# Patient Record
Sex: MALE | Race: WHITE | NOT HISPANIC OR LATINO
[De-identification: names, ages, dates, MRNs, and addresses within clinical notes are randomized per-mention and may not be internally consistent; named-entity substitution may affect disease eponyms.]

---

## 2021-10-26 PROBLEM — Z00.00 ENCOUNTER FOR PREVENTIVE HEALTH EXAMINATION: Status: ACTIVE | Noted: 2021-10-26

## 2021-11-01 ENCOUNTER — APPOINTMENT (OUTPATIENT)
Dept: HEART AND VASCULAR | Facility: CLINIC | Age: 81
End: 2021-11-01
Payer: MEDICARE

## 2021-11-01 VITALS
HEART RATE: 80 BPM | HEIGHT: 69 IN | OXYGEN SATURATION: 98 % | WEIGHT: 165 LBS | TEMPERATURE: 98.7 F | DIASTOLIC BLOOD PRESSURE: 80 MMHG | SYSTOLIC BLOOD PRESSURE: 140 MMHG | BODY MASS INDEX: 24.44 KG/M2

## 2021-11-01 PROCEDURE — 99204 OFFICE O/P NEW MOD 45 MIN: CPT

## 2021-11-01 RX ORDER — DORZOLAMIDE HYDROCHLORIDE TIMOLOL MALEATE 20; 5 MG/ML; MG/ML
22.3-6.8 SOLUTION/ DROPS OPHTHALMIC
Qty: 30 | Refills: 0 | Status: ACTIVE | COMMUNITY
Start: 2021-03-05

## 2021-11-01 RX ORDER — LATANOPROST/PF 0.005 %
0.01 DROPS OPHTHALMIC (EYE)
Qty: 7 | Refills: 0 | Status: ACTIVE | COMMUNITY
Start: 2021-09-29

## 2021-11-02 NOTE — ASSESSMENT
[FreeTextEntry1] : 82 y/o M with asthma, chronic diastolic heart failure, severe AS, moderate AI, HLD who presents with NYHA III symptoms. No wheezing on exam, suspect SOB AS related (and not asthma).\par -Pt will need CCTA (Structural) and Cardiac cath at [facility]\par -CT surgery consult with Dr. Garcia\par -continue current BP/lipid control with Dr. López\par -TAVR workup (including carotid ultrasound)\par

## 2021-11-02 NOTE — PHYSICAL EXAM
[Well Developed] : well developed [Well Nourished] : well nourished [No Acute Distress] : no acute distress [Normal Conjunctiva] : normal conjunctiva [Normal Venous Pressure] : normal venous pressure [No Carotid Bruit] : no carotid bruit [No Rub] : no rub [No Gallop] : no gallop [Clear Lung Fields] : clear lung fields [Good Air Entry] : good air entry [No Respiratory Distress] : no respiratory distress  [Soft] : abdomen soft [Non Tender] : non-tender [No Masses/organomegaly] : no masses/organomegaly [Normal Bowel Sounds] : normal bowel sounds [Normal Gait] : normal gait [No Edema] : no edema [No Cyanosis] : no cyanosis [No Clubbing] : no clubbing [No Varicosities] : no varicosities [No Rash] : no rash [No Skin Lesions] : no skin lesions [Moves all extremities] : moves all extremities [No Focal Deficits] : no focal deficits [Normal Speech] : normal speech [Alert and Oriented] : alert and oriented [Normal memory] : normal memory [de-identified] : +KELSEY III/VI with soft S2

## 2021-11-02 NOTE — REASON FOR VISIT
[Structural Heart and Valve Disease] : structural heart and valve disease [FreeTextEntry1] : 82 y/o F with Asthma, former smoker chronic diastolic heart failure and severe AS presenting with NYHA III symptoms.\par Pt reports occasional chest pain (unrelated to activity) under the L. breast bone for several weeks as well. He gets more SOB with exertion than before. \par \par EKG reviewed from 10/21/21- NSR, no ST changes\par Echo reviewed from 10/21/21- Normal LVEF, severe AS (MG 72mmHg, PG 130mmHg, GARRETT 0.7)\par Labs reviewed from 10/11/21- BMP WNL, Lipids- , CBC WNL

## 2021-11-02 NOTE — REVIEW OF SYSTEMS
[Dyspnea on exertion] : dyspnea during exertion [Chest Discomfort] : chest discomfort [Fever] : no fever [Chills] : no chills [Blurry Vision] : no blurred vision [Earache] : no earache [Sore Throat] : no sore throat [Lower Ext Edema] : no extremity edema [Syncope] : no syncope [Cough] : no cough [Abdominal Pain] : no abdominal pain [Change in Appetite] : no change in appetite [Urinary Frequency] : no change in urinary frequency [Joint Pain] : no joint pain [Rash] : no rash [Confusion] : no confusion was observed [Easy Bleeding] : no tendency for easy bleeding

## 2021-11-15 ENCOUNTER — APPOINTMENT (OUTPATIENT)
Dept: HEART AND VASCULAR | Facility: CLINIC | Age: 81
End: 2021-11-15
Payer: MEDICARE

## 2021-11-15 VITALS
SYSTOLIC BLOOD PRESSURE: 156 MMHG | BODY MASS INDEX: 24.29 KG/M2 | OXYGEN SATURATION: 96 % | HEART RATE: 79 BPM | WEIGHT: 164 LBS | DIASTOLIC BLOOD PRESSURE: 76 MMHG | HEIGHT: 69 IN

## 2021-11-15 PROCEDURE — 99214 OFFICE O/P EST MOD 30 MIN: CPT

## 2021-11-15 RX ORDER — ASPIRIN 81 MG
81 TABLET, DELAYED RELEASE (ENTERIC COATED) ORAL
Refills: 0 | Status: ACTIVE | COMMUNITY

## 2021-11-15 NOTE — PHYSICAL EXAM
[Well Developed] : well developed [Well Nourished] : well nourished [No Acute Distress] : no acute distress [Normal Conjunctiva] : normal conjunctiva [Normal Venous Pressure] : normal venous pressure [No Carotid Bruit] : no carotid bruit [No Rub] : no rub [No Gallop] : no gallop [Clear Lung Fields] : clear lung fields [Good Air Entry] : good air entry [No Respiratory Distress] : no respiratory distress  [Soft] : abdomen soft [Non Tender] : non-tender [No Masses/organomegaly] : no masses/organomegaly [Normal Bowel Sounds] : normal bowel sounds [Normal Gait] : normal gait [No Edema] : no edema [No Cyanosis] : no cyanosis [No Clubbing] : no clubbing [No Varicosities] : no varicosities [No Rash] : no rash [No Skin Lesions] : no skin lesions [Moves all extremities] : moves all extremities [No Focal Deficits] : no focal deficits [Normal Speech] : normal speech [Alert and Oriented] : alert and oriented [Normal memory] : normal memory [de-identified] : +KELSEY III/VI with soft S2

## 2021-11-15 NOTE — REASON FOR VISIT
[Cardiac Failure] : cardiac failure [Structural Heart and Valve Disease] : structural heart and valve disease [FreeTextEntry1] : 82 y/o F with Asthma, former smoker chronic diastolic heart failure and severe AS presenting with NYHA III symptoms/CCS III equivalent s/p PCI of the pLAD.\par Pt with improved symptoms since PCI and no CP since procedure. He still reports NYHA II symptoms though with exertion.\par No syncope/dizziness\par \par EKG reviewed from 10/21/21- NSR, no ST changes\par Echo reviewed from 10/21/21- Normal LVEF, severe AS (MG 72mmHg, PG 130mmHg, GARRETT 0.7)\par Labs reviewed from 10/11/21- BMP WNL, Lipids- , CBC WNL \par Cardiac cath 11/5/21- 1V CAD, pLAD 75% s/p PCI with excellent result

## 2021-12-06 ENCOUNTER — TRANSCRIPTION ENCOUNTER (OUTPATIENT)
Age: 81
End: 2021-12-06

## 2021-12-06 ENCOUNTER — NON-APPOINTMENT (OUTPATIENT)
Age: 81
End: 2021-12-06

## 2021-12-06 VITALS
RESPIRATION RATE: 17 BRPM | WEIGHT: 173.06 LBS | DIASTOLIC BLOOD PRESSURE: 62 MMHG | TEMPERATURE: 98 F | SYSTOLIC BLOOD PRESSURE: 132 MMHG | OXYGEN SATURATION: 97 % | HEIGHT: 68 IN | HEART RATE: 63 BPM

## 2021-12-06 NOTE — PATIENT PROFILE ADULT - FALL HARM RISK - UNIVERSAL INTERVENTIONS
Bed in lowest position, wheels locked, appropriate side rails in place/Call bell, personal items and telephone in reach/Instruct patient to call for assistance before getting out of bed or chair/Non-slip footwear when patient is out of bed/Indian to call system/Physically safe environment - no spills, clutter or unnecessary equipment/Purposeful Proactive Rounding/Room/bathroom lighting operational, light cord in reach

## 2021-12-07 ENCOUNTER — INPATIENT (INPATIENT)
Facility: HOSPITAL | Age: 81
LOS: 0 days | Discharge: HOME | DRG: 267 | End: 2021-12-08
Attending: INTERNAL MEDICINE | Admitting: INTERNAL MEDICINE
Payer: MEDICARE

## 2021-12-07 ENCOUNTER — APPOINTMENT (OUTPATIENT)
Dept: CARDIOTHORACIC SURGERY | Facility: HOSPITAL | Age: 81
End: 2021-12-07

## 2021-12-07 ENCOUNTER — APPOINTMENT (OUTPATIENT)
Dept: CARDIOTHORACIC SURGERY | Facility: CLINIC | Age: 81
End: 2021-12-07

## 2021-12-07 DIAGNOSIS — I25.10 ATHEROSCLEROTIC HEART DISEASE OF NATIVE CORONARY ARTERY WITHOUT ANGINA PECTORIS: Chronic | ICD-10-CM

## 2021-12-07 LAB
ALBUMIN SERPL ELPH-MCNC: 3.7 G/DL — SIGNIFICANT CHANGE UP (ref 3.3–5)
ALP SERPL-CCNC: 62 U/L — SIGNIFICANT CHANGE UP (ref 40–120)
ALT FLD-CCNC: 28 U/L — SIGNIFICANT CHANGE UP (ref 10–45)
ANION GAP SERPL CALC-SCNC: 11 MMOL/L — SIGNIFICANT CHANGE UP (ref 5–17)
ANION GAP SERPL CALC-SCNC: 9 MMOL/L — SIGNIFICANT CHANGE UP (ref 5–17)
APTT BLD: 28.8 SEC — SIGNIFICANT CHANGE UP (ref 27.5–35.5)
APTT BLD: 32.4 SEC — SIGNIFICANT CHANGE UP (ref 27.5–35.5)
AST SERPL-CCNC: 27 U/L — SIGNIFICANT CHANGE UP (ref 10–40)
BASOPHILS # BLD AUTO: 0.04 K/UL — SIGNIFICANT CHANGE UP (ref 0–0.2)
BASOPHILS NFR BLD AUTO: 0.6 % — SIGNIFICANT CHANGE UP (ref 0–2)
BILIRUB SERPL-MCNC: 0.8 MG/DL — SIGNIFICANT CHANGE UP (ref 0.2–1.2)
BLD GP AB SCN SERPL QL: NEGATIVE — SIGNIFICANT CHANGE UP
BLD GP AB SCN SERPL QL: NEGATIVE — SIGNIFICANT CHANGE UP
BUN SERPL-MCNC: 17 MG/DL — SIGNIFICANT CHANGE UP (ref 7–23)
BUN SERPL-MCNC: 18 MG/DL — SIGNIFICANT CHANGE UP (ref 7–23)
CALCIUM SERPL-MCNC: 8.7 MG/DL — SIGNIFICANT CHANGE UP (ref 8.4–10.5)
CALCIUM SERPL-MCNC: 9.7 MG/DL — SIGNIFICANT CHANGE UP (ref 8.4–10.5)
CHLORIDE SERPL-SCNC: 105 MMOL/L — SIGNIFICANT CHANGE UP (ref 96–108)
CHLORIDE SERPL-SCNC: 109 MMOL/L — HIGH (ref 96–108)
CO2 SERPL-SCNC: 22 MMOL/L — SIGNIFICANT CHANGE UP (ref 22–31)
CO2 SERPL-SCNC: 26 MMOL/L — SIGNIFICANT CHANGE UP (ref 22–31)
CREAT SERPL-MCNC: 0.89 MG/DL — SIGNIFICANT CHANGE UP (ref 0.5–1.3)
CREAT SERPL-MCNC: 0.99 MG/DL — SIGNIFICANT CHANGE UP (ref 0.5–1.3)
EOSINOPHIL # BLD AUTO: 0.08 K/UL — SIGNIFICANT CHANGE UP (ref 0–0.5)
EOSINOPHIL NFR BLD AUTO: 1.1 % — SIGNIFICANT CHANGE UP (ref 0–6)
GAS PNL BLDA: SIGNIFICANT CHANGE UP
GLUCOSE SERPL-MCNC: 100 MG/DL — HIGH (ref 70–99)
GLUCOSE SERPL-MCNC: 99 MG/DL — SIGNIFICANT CHANGE UP (ref 70–99)
HCT VFR BLD CALC: 39.7 % — SIGNIFICANT CHANGE UP (ref 39–50)
HCT VFR BLD CALC: 44.9 % — SIGNIFICANT CHANGE UP (ref 39–50)
HGB BLD-MCNC: 12.8 G/DL — LOW (ref 13–17)
HGB BLD-MCNC: 14.2 G/DL — SIGNIFICANT CHANGE UP (ref 13–17)
IMM GRANULOCYTES NFR BLD AUTO: 0.3 % — SIGNIFICANT CHANGE UP (ref 0–1.5)
INR BLD: 1.04 — SIGNIFICANT CHANGE UP (ref 0.88–1.16)
INR BLD: 1.13 — SIGNIFICANT CHANGE UP (ref 0.88–1.16)
LYMPHOCYTES # BLD AUTO: 0.87 K/UL — LOW (ref 1–3.3)
LYMPHOCYTES # BLD AUTO: 12.1 % — LOW (ref 13–44)
MAGNESIUM SERPL-MCNC: 1.9 MG/DL — SIGNIFICANT CHANGE UP (ref 1.6–2.6)
MAGNESIUM SERPL-MCNC: 2 MG/DL — SIGNIFICANT CHANGE UP (ref 1.6–2.6)
MCHC RBC-ENTMCNC: 28.9 PG — SIGNIFICANT CHANGE UP (ref 27–34)
MCHC RBC-ENTMCNC: 29.5 PG — SIGNIFICANT CHANGE UP (ref 27–34)
MCHC RBC-ENTMCNC: 31.6 GM/DL — LOW (ref 32–36)
MCHC RBC-ENTMCNC: 32.2 GM/DL — SIGNIFICANT CHANGE UP (ref 32–36)
MCV RBC AUTO: 91.3 FL — SIGNIFICANT CHANGE UP (ref 80–100)
MCV RBC AUTO: 91.5 FL — SIGNIFICANT CHANGE UP (ref 80–100)
MONOCYTES # BLD AUTO: 0.34 K/UL — SIGNIFICANT CHANGE UP (ref 0–0.9)
MONOCYTES NFR BLD AUTO: 4.7 % — SIGNIFICANT CHANGE UP (ref 2–14)
NEUTROPHILS # BLD AUTO: 5.85 K/UL — SIGNIFICANT CHANGE UP (ref 1.8–7.4)
NEUTROPHILS NFR BLD AUTO: 81.2 % — HIGH (ref 43–77)
NRBC # BLD: 0 /100 WBCS — SIGNIFICANT CHANGE UP (ref 0–0)
NRBC # BLD: 0 /100 WBCS — SIGNIFICANT CHANGE UP (ref 0–0)
NT-PROBNP SERPL-SCNC: 584 PG/ML — HIGH (ref 0–300)
NT-PROBNP SERPL-SCNC: 600 PG/ML — HIGH (ref 0–300)
PHOSPHATE SERPL-MCNC: 3.1 MG/DL — SIGNIFICANT CHANGE UP (ref 2.5–4.5)
PLATELET # BLD AUTO: 137 K/UL — LOW (ref 150–400)
PLATELET # BLD AUTO: 170 K/UL — SIGNIFICANT CHANGE UP (ref 150–400)
POTASSIUM SERPL-MCNC: 4 MMOL/L — SIGNIFICANT CHANGE UP (ref 3.5–5.3)
POTASSIUM SERPL-MCNC: 4.2 MMOL/L — SIGNIFICANT CHANGE UP (ref 3.5–5.3)
POTASSIUM SERPL-SCNC: 4 MMOL/L — SIGNIFICANT CHANGE UP (ref 3.5–5.3)
POTASSIUM SERPL-SCNC: 4.2 MMOL/L — SIGNIFICANT CHANGE UP (ref 3.5–5.3)
PROT SERPL-MCNC: 6 G/DL — SIGNIFICANT CHANGE UP (ref 6–8.3)
PROTHROM AB SERPL-ACNC: 12.4 SEC — SIGNIFICANT CHANGE UP (ref 10.6–13.6)
PROTHROM AB SERPL-ACNC: 13.5 SEC — SIGNIFICANT CHANGE UP (ref 10.6–13.6)
RBC # BLD: 4.34 M/UL — SIGNIFICANT CHANGE UP (ref 4.2–5.8)
RBC # BLD: 4.92 M/UL — SIGNIFICANT CHANGE UP (ref 4.2–5.8)
RBC # FLD: 13.2 % — SIGNIFICANT CHANGE UP (ref 10.3–14.5)
RBC # FLD: 13.2 % — SIGNIFICANT CHANGE UP (ref 10.3–14.5)
RH IG SCN BLD-IMP: POSITIVE — SIGNIFICANT CHANGE UP
RH IG SCN BLD-IMP: POSITIVE — SIGNIFICANT CHANGE UP
SODIUM SERPL-SCNC: 140 MMOL/L — SIGNIFICANT CHANGE UP (ref 135–145)
SODIUM SERPL-SCNC: 142 MMOL/L — SIGNIFICANT CHANGE UP (ref 135–145)
WBC # BLD: 7.2 K/UL — SIGNIFICANT CHANGE UP (ref 3.8–10.5)
WBC # BLD: 9.14 K/UL — SIGNIFICANT CHANGE UP (ref 3.8–10.5)
WBC # FLD AUTO: 7.2 K/UL — SIGNIFICANT CHANGE UP (ref 3.8–10.5)
WBC # FLD AUTO: 9.14 K/UL — SIGNIFICANT CHANGE UP (ref 3.8–10.5)

## 2021-12-07 PROCEDURE — 99222 1ST HOSP IP/OBS MODERATE 55: CPT | Mod: 25

## 2021-12-07 PROCEDURE — 99291 CRITICAL CARE FIRST HOUR: CPT

## 2021-12-07 PROCEDURE — 93308 TTE F-UP OR LMTD: CPT | Mod: 26

## 2021-12-07 PROCEDURE — 71045 X-RAY EXAM CHEST 1 VIEW: CPT | Mod: 26

## 2021-12-07 PROCEDURE — 33361 REPLACE AORTIC VALVE PERQ: CPT | Mod: 62,Q0

## 2021-12-07 RX ORDER — ATORVASTATIN CALCIUM 80 MG/1
40 TABLET, FILM COATED ORAL AT BEDTIME
Refills: 0 | Status: DISCONTINUED | OUTPATIENT
Start: 2021-12-07 | End: 2021-12-08

## 2021-12-07 RX ORDER — ASPIRIN/CALCIUM CARB/MAGNESIUM 324 MG
81 TABLET ORAL DAILY
Refills: 0 | Status: DISCONTINUED | OUTPATIENT
Start: 2021-12-08 | End: 2021-12-08

## 2021-12-07 RX ORDER — DORZOLAMIDE HYDROCHLORIDE TIMOLOL MALEATE 20; 5 MG/ML; MG/ML
1 SOLUTION/ DROPS OPHTHALMIC
Qty: 0 | Refills: 0 | DISCHARGE

## 2021-12-07 RX ORDER — POLYETHYLENE GLYCOL 3350 17 G/17G
17 POWDER, FOR SOLUTION ORAL DAILY
Refills: 0 | Status: DISCONTINUED | OUTPATIENT
Start: 2021-12-07 | End: 2021-12-08

## 2021-12-07 RX ORDER — LATANOPROST 0.05 MG/ML
1 SOLUTION/ DROPS OPHTHALMIC; TOPICAL AT BEDTIME
Refills: 0 | Status: DISCONTINUED | OUTPATIENT
Start: 2021-12-07 | End: 2021-12-08

## 2021-12-07 RX ORDER — CLOPIDOGREL BISULFATE 75 MG/1
75 TABLET, FILM COATED ORAL DAILY
Refills: 0 | Status: DISCONTINUED | OUTPATIENT
Start: 2021-12-08 | End: 2021-12-08

## 2021-12-07 RX ORDER — PANTOPRAZOLE SODIUM 20 MG/1
40 TABLET, DELAYED RELEASE ORAL ONCE
Refills: 0 | Status: COMPLETED | OUTPATIENT
Start: 2021-12-07 | End: 2021-12-08

## 2021-12-07 RX ORDER — LATANOPROST 0.05 MG/ML
1 SOLUTION/ DROPS OPHTHALMIC; TOPICAL
Qty: 0 | Refills: 0 | DISCHARGE

## 2021-12-07 RX ORDER — CEFAZOLIN SODIUM 1 G
2000 VIAL (EA) INJECTION EVERY 8 HOURS
Refills: 0 | Status: COMPLETED | OUTPATIENT
Start: 2021-12-07 | End: 2021-12-08

## 2021-12-07 RX ORDER — SODIUM CHLORIDE 9 MG/ML
1000 INJECTION INTRAMUSCULAR; INTRAVENOUS; SUBCUTANEOUS
Refills: 0 | Status: DISCONTINUED | OUTPATIENT
Start: 2021-12-07 | End: 2021-12-08

## 2021-12-07 RX ORDER — DORZOLAMIDE HYDROCHLORIDE TIMOLOL MALEATE 20; 5 MG/ML; MG/ML
1 SOLUTION/ DROPS OPHTHALMIC
Refills: 0 | Status: DISCONTINUED | OUTPATIENT
Start: 2021-12-07 | End: 2021-12-08

## 2021-12-07 RX ORDER — HEPARIN SODIUM 5000 [USP'U]/ML
5000 INJECTION INTRAVENOUS; SUBCUTANEOUS EVERY 8 HOURS
Refills: 0 | Status: DISCONTINUED | OUTPATIENT
Start: 2021-12-07 | End: 2021-12-08

## 2021-12-07 RX ADMIN — Medication 100 MILLIGRAM(S): at 21:41

## 2021-12-07 RX ADMIN — LATANOPROST 1 DROP(S): 0.05 SOLUTION/ DROPS OPHTHALMIC; TOPICAL at 21:41

## 2021-12-07 RX ADMIN — ATORVASTATIN CALCIUM 40 MILLIGRAM(S): 80 TABLET, FILM COATED ORAL at 21:41

## 2021-12-07 RX ADMIN — DORZOLAMIDE HYDROCHLORIDE TIMOLOL MALEATE 1 DROP(S): 20; 5 SOLUTION/ DROPS OPHTHALMIC at 18:17

## 2021-12-07 RX ADMIN — HEPARIN SODIUM 5000 UNIT(S): 5000 INJECTION INTRAVENOUS; SUBCUTANEOUS at 21:41

## 2021-12-07 NOTE — H&P ADULT - NSICDXPASTSURGICALHX_GEN_ALL_CORE_FT
PAST SURGICAL HISTORY:  CAD S/P percutaneous coronary angioplasty PCI 11/2021     PAST SURGICAL HISTORY:  CAD S/P percutaneous coronary angioplasty PCI 11/5/2021

## 2021-12-07 NOTE — PROGRESS NOTE ADULT - SUBJECTIVE AND OBJECTIVE BOX
Patient discussed on morning rounds with Dr. Nuno    Operation / Date: 12/7/21 26mm Mckeon TAVR    SUBJECTIVE ASSESSMENT:  81y Male seen and examined. Feeling well. No complaints.        Vital Signs Last 24 Hrs  T(C): 36.5 (07 Dec 2021 17:27), Max: 36.7 (07 Dec 2021 17:06)  T(F): 97.7 (07 Dec 2021 17:27), Max: 98 (07 Dec 2021 17:06)  HR: 71 (07 Dec 2021 16:48) (71 - 71)  BP: --  BP(mean): --  RR: 15 (07 Dec 2021 16:48) (15 - 15)  SpO2: 100% (07 Dec 2021 16:48) (100% - 100%)  I&O's Detail    07 Dec 2021 07:01  -  07 Dec 2021 17:47  --------------------------------------------------------  IN:  Total IN: 0 mL    OUT:    Voided (mL): 200 mL  Total OUT: 200 mL    Total NET: -200 mL      PHYSICAL EXAM:  Appearance: No acute distress.  Neurologic: AAOx3, no AMS or focal deficits.  Responds appropriately to verbal and physical stimuli; exhibits purposeful movement in all extremities.  HEENT: dry mucus membranes  Neck: Supple, no JVD  Cardiovascular: RRR, S1 S2. No m/r/g.  Respiratory: No acute respiratory distress. CTAB   Gastrointestinal:  Soft, non-tender, non-distended.	  Extremities: warm, well perfused. No edema.  Vascular: Peripheral pulses palpable 2+ bilaterally.  Groins: soft, no hematoma bilaterally      LABS:                        12.8   7.20  )-----------( 137      ( 07 Dec 2021 17:05 )             39.7       COUMADIN:  No    PT/INR - ( 07 Dec 2021 17:05 )   PT: 13.5 sec;   INR: 1.13          PTT - ( 07 Dec 2021 17:05 )  PTT:32.4 sec    12-07    x   |  x   |  17  ----------------------------<  99  x    |  22  |  0.89    Ca    8.7      07 Dec 2021 17:05  Phos  3.1     12-07  Mg     1.9     12-07            MEDICATIONS  (STANDING):  atorvastatin 40 milliGRAM(s) Oral at bedtime  ceFAZolin   IVPB 2000 milliGRAM(s) IV Intermittent every 8 hours  dorzolamide 2%/timolol 0.5% Ophthalmic Solution 1 Drop(s) Both EYES two times a day  heparin   Injectable 5000 Unit(s) SubCutaneous every 8 hours  latanoprost 0.005% Ophthalmic Solution 1 Drop(s) Both EYES at bedtime  pantoprazole    Tablet 40 milliGRAM(s) Oral once  polyethylene glycol 3350 17 Gram(s) Oral daily  sodium chloride 0.9%. 1000 milliLiter(s) (10 mL/Hr) IV Continuous <Continuous>    MEDICATIONS  (PRN):        RADIOLOGY & ADDITIONAL TESTS:    
CTICU  CRITICAL  CARE  attending     Hand off received 					   Pertinent clinical, laboratory, radiographic, hemodynamic, echocardiographic, respiratory data, microbiologic data and chart were reviewed and analyzed frequently throughout the course of the day and night      81 years old male with Asthma, former smoker chronic diastolic heart failure and severe AS presenting with NYHA III symptoms/CCS III equivalent s/p PCI of the pLAD.  Pt with improved symptoms since PCI and no CP since procedure. He still reports NYHA II symptoms though with exertion.  No syncope/dizziness.    S/P TAVR      FAMILY HISTORY:  PAST MEDICAL & SURGICAL HISTORY:  CAD (coronary artery disease)    HLD (hyperlipidemia)  Aortic stenosis  Chronic diastolic heart failure  Glaucoma  CAD S/P percutaneous coronary angioplasty  PCI 11/5/2021        14 system review was unremarkable    Vital signs, hemodynamic and respiratory parameters were reviewed from the bedside nursing flow sheet.  ICU Vital Signs Last 24 Hrs  T(C): 36.5 (07 Dec 2021 17:27), Max: 36.7 (07 Dec 2021 17:06)  T(F): 97.7 (07 Dec 2021 17:27), Max: 98 (07 Dec 2021 17:06)  HR: 72 (07 Dec 2021 21:00) (66 - 78)  BP: 102/55 (07 Dec 2021 21:00) (100/58 - 102/55)  BP(mean): 75 (07 Dec 2021 21:00) (75 - 75)  ABP: 100/46 (07 Dec 2021 21:00) (91/51 - 124/53)  ABP(mean): 63 (07 Dec 2021 21:00) (63 - 81)  RR: 15 (07 Dec 2021 16:48) (15 - 15)  SpO2: 95% (07 Dec 2021 21:00) (94% - 100%)    Adult Advanced Hemodynamics Last 24 Hrs  CVP(mm Hg): --  CVP(cm H2O): --  CO: --  CI: --  PA: --  PA(mean): --  PCWP: --  SVR: --  SVRI: --  PVR: --  PVRI: --, ABG - ( 07 Dec 2021 17:05 )  pH, Arterial: 7.39  pH, Blood: x     /  pCO2: 37    /  pO2: 100   / HCO3: 22    / Base Excess: -2.2  /  SaO2: 98.9                Intake and output was reviewed and the fluid balance was calculated  Daily     Daily   I&O's Summary    07 Dec 2021 07:01  -  07 Dec 2021 21:29  --------------------------------------------------------  IN: 0 mL / OUT: 400 mL / NET: -400 mL        All lines and drain sites were assessed    Neuro: No change in the mental status from the baseline. Follows commands. Moves all 4 extremities.  Neck: No JVD.  CVS: S1, S2, No S3.  Lungs: Good air entry bilaterally.  Abd: Soft. No tenderness. + Bowel sounds.  Vascular: + DP/PT.  Extremities: No edema.  Lymphatic: Normal.  Skin: No abnormalities.      labs  CBC Full  -  ( 07 Dec 2021 17:05 )  WBC Count : 7.20 K/uL  RBC Count : 4.34 M/uL  Hemoglobin : 12.8 g/dL  Hematocrit : 39.7 %  Platelet Count - Automated : 137 K/uL  Mean Cell Volume : 91.5 fl  Mean Cell Hemoglobin : 29.5 pg  Mean Cell Hemoglobin Concentration : 32.2 gm/dL  Auto Neutrophil # : 5.85 K/uL  Auto Lymphocyte # : 0.87 K/uL  Auto Monocyte # : 0.34 K/uL  Auto Eosinophil # : 0.08 K/uL  Auto Basophil # : 0.04 K/uL  Auto Neutrophil % : 81.2 %  Auto Lymphocyte % : 12.1 %  Auto Monocyte % : 4.7 %  Auto Eosinophil % : 1.1 %  Auto Basophil % : 0.6 %    12-07    142  |  109<H>  |  17  ----------------------------<  99  4.0   |  22  |  0.89    Ca    8.7      07 Dec 2021 17:05  Phos  3.1     12-07  Mg     1.9     12-07    TPro  6.0  /  Alb  3.7  /  TBili  0.8  /  DBili  x   /  AST  27  /  ALT  28  /  AlkPhos  62  12-07    PT/INR - ( 07 Dec 2021 17:05 )   PT: 13.5 sec;   INR: 1.13          PTT - ( 07 Dec 2021 17:05 )  PTT:32.4 sec  The current medications were reviewed   MEDICATIONS  (STANDING):  atorvastatin 40 milliGRAM(s) Oral at bedtime  ceFAZolin   IVPB 2000 milliGRAM(s) IV Intermittent every 8 hours  dorzolamide 2%/timolol 0.5% Ophthalmic Solution 1 Drop(s) Both EYES two times a day  heparin   Injectable 5000 Unit(s) SubCutaneous every 8 hours  latanoprost 0.005% Ophthalmic Solution 1 Drop(s) Both EYES at bedtime  pantoprazole    Tablet 40 milliGRAM(s) Oral once  polyethylene glycol 3350 17 Gram(s) Oral daily  sodium chloride 0.9%. 1000 milliLiter(s) (10 mL/Hr) IV Continuous <Continuous>          81y old  Male with severe aortic stenosis.  S/P TAVR  Hemodynamically stable.  Good oxygenation.  Fair urine out put.        My plan includes :  Resume antiglaucoma meds.  Statin and Betablocker.  Close hemodynamic, ventilatory and drain monitoring and management  Monitor for arrhythmias and monitor parameters for organ perfusion  Monitor neurologic status  Monitor renal function.  Head of the bed should remain elevated to 45 deg .   Chest PT and IS will be encouraged  Monitor adequacy of oxygenation and ventilation and attempt to wean oxygen  Nutritional goals will be met using po eventually , ensure adequate caloric intake and monitor the same  Stress ulcer and VTE prophylaxis will be achieved    Glycemic control is satisfactory  Electrolytes have been repleted as necessary and wound care has been carried out. Pain control has been achieved.   Aggressive physical therapy and early mobility and ambulation goals will be met   The family was updated about the course and plan  CRITICAL CARE TIME SPENT in evaluation and management, reassessments, review and interpretation of labs and x-rays, ventilator and hemodynamic management, formulating a plan and coordinating care: ___90____ MIN.  Time does not include procedural time.  CTICU ATTENDING     					    Robert Saucedo MD

## 2021-12-07 NOTE — H&P ADULT - ATTENDING COMMENTS
80 y/o M with pmhx above s/p TAVR with a 26mm Bebe Ultra  -ASA/plaxix (CAD)  -euvolemic (chronic diastolic heart failure)  -continue statin therapy

## 2021-12-07 NOTE — H&P ADULT - NSHPPHYSICALEXAM_GEN_ALL_CORE
Physical Exam  CONSTITUTIONAL: Pleasant appearing in NAD.  NEURO: AAO x4. No neuro deficits appreciated.                      EYES: Pupils equal and reactive. No jaundice.  ENMT: NCAT. Neck supple and nontender.  CV: RRR. S1S2.  RESPIRATORY: CTABL. No wheezing, rhonchi.  GI: Soft, NT, ND. Positive BS.  : Atraumatic without bleeding. No esparza present.  MUSKULOSKELETAL: Equal ROM throughout.  SKIN: No erythema, cyanosis. Physical Exam  CONSTITUTIONAL: Pleasant appearing in NAD.  NEURO: AAO x4. No neuro deficits appreciated.                      EYES: Pupils equal and reactive. No jaundice.  ENMT: NCAT. Neck supple and nontender.  CV: +KELSEY with soft S2  RESPIRATORY: CTABL. No wheezing, rhonchi.  GI: Soft, NT, ND. Positive BS.  : Atraumatic without bleeding. No esparza present.  MUSKULOSKELETAL: Equal ROM throughout.  SKIN: No erythema, cyanosis.

## 2021-12-07 NOTE — BRIEF OPERATIVE NOTE - OPERATION/FINDINGS
Transfemoral TAVR utilizing b/l groins with percutaneous closure  TVP via groin, removed in OR w/manual pressure, no rhythm issues  EF nl

## 2021-12-07 NOTE — PROGRESS NOTE ADULT - ASSESSMENT
81 year old male with history of asthma, former smoker, chronic diastolic heart failure and severe AS with NYHA III symptoms and recent PCI to pLAD who underwent tramsfemoral TAVR 26mm Mckeon valve with Dr. Nuno. Procedure was uncomplicated and he was transferred to [address] [place] [address] ICU.        Neurovascular:  - No issures  - pain control with Tylenol PRN      Cardiovascular:   - POD0 s/p TAVR, 26mm Mckeon, no intraop EKG changes  - ASA 81, Plavix 75 daily  - Recent PCI to LAD - continue ASA, Plavix, statin  - HR and BP well controlld.    Respiratory:   02 Sat = 98% on 2L.  -If on oxygen wean to RA from for O2 Sat > 93%.  -Encourage C+DB and Use of IS 10x / hr while awake.  -CXR clear postop    GI:   - ADAT  - Protonix for GI PPX    Renal / :  -Monitor renal function.  -Monitor I/O's.    Endocrine:    - no issues    Hematologic:  - H/H stable postop  - Coags back to baseline after TAVR    ID:  - No issues  -Observe for SIRS/Sepsis Syndrome.    Prophylaxis:  -DVT prophylaxis with 5000 SubQ Heparin q8h.  -SCD's    Disposition:  -ICU for frequent monitoring.

## 2021-12-07 NOTE — H&P ADULT - HISTORY OF PRESENT ILLNESS
80 y/o F with Asthma, former smoker chronic diastolic heart failure and severe AS presenting with NYHA III symptoms/CCS III equivalent s/p PCI of the pLAD.  Pt with improved symptoms since PCI and no CP since procedure. He still reports NYHA II symptoms though with exertion.  No syncope/dizziness    Patient completed Pre-surgical testing and is scheduled for TAVR with Dr. Nuno.    Patient seen and examined prior to OR. Denies fever, chills, N/V, chest pain, SOB, weakness, dizziness, syncope. Medications reviewed with patient. Patient took his ASA 81 and Plavix this morning. He has been NPO since midnight. Patient is to undergo TAVR 12/7 with Dr. Nuno and Dr. Garcia. 80 y/o F with Asthma, former smoker chronic diastolic heart failure and severe AS presenting with NYHA III symptoms/CCS III equivalent s/p PCI of the pLAD.  Pt with improved symptoms since PCI and no CP since procedure. He still reports NYHA II symptoms though with exertion.  No syncope/dizziness    Patient completed Pre-surgical testing and is scheduled for TAVR with Dr. Nuno.    Patient seen and examined prior to OR. Admits to mild chest pain without radiation or other associated symptoms but is anxious. Denies fever, chills, N/V, SOB, weakness, dizziness, syncope. Medications reviewed with patient. Patient took his ASA 81 and Plavix this morning. He has been NPO since midnight. Patient is to undergo TAVR 12/7 with Dr. Nuno and Dr. Garcia.

## 2021-12-07 NOTE — H&P ADULT - NSHPREVIEWOFSYSTEMS_GEN_ALL_CORE
Review of Systems  CONSTITUTIONAL:  Denies Fevers / chills, sweats, fatigue, weight loss, weight gain                                      NEURO:  Denies parathesias, seizures, syncope, confusion                                                                                EYES:  Denies Blurry vision, discharge, pain, loss of vision                                                                                    ENMT:  Denies Difficulty hearing, vertigo, dysphagia, epistaxis, recent dental work                                       CV:  Denies Chest pain, palpitations, PUCKTET, orthopnea                                                                                          RESPIRATORY:  Denies Wheezing, SOB, cough / sputum, hemoptysis                                                                GI:  Denies Nausea, vommiting, diarrhea, constipation, melena, difficulty swallowing                                               : Denies Hematuria, dysuria, urgency, incontinence                                                                                         MUSKULOSKELETAL:  Denies arthritis, joint swelling, muscle weakness                                                             SKIN/BREAST:  Denies rash, itching, aicha loss, masses                                                                                            PSYCH:  Denies depresion, anxiety, suicidal ideation                                                                                               HEME/LYMPH:  Denies bruises easily, enlarged lymph nodes, tender lymph nodes                                        ENDOCRINE:  Denies cold intolerance, heat intolerance, polydipsia Review of Systems  CONSTITUTIONAL:  Denies Fevers / chills, sweats, fatigue, weight loss, weight gain                                      NEURO:  Denies parathesias, seizures, syncope, confusion                                                                                EYES:  Denies Blurry vision, discharge, pain, loss of vision                                                                                    ENMT:  Denies Difficulty hearing, vertigo, dysphagia, epistaxis, recent dental work                                       CV:  Admits to mild chest pain. Denies palpitations, PUCKETT, orthopnea                                                                                          RESPIRATORY:  Denies Wheezing, SOB, cough / sputum, hemoptysis                                                                GI:  Denies Nausea, vommiting, diarrhea, constipation, melena, difficulty swallowing                                               : Denies Hematuria, dysuria, urgency, incontinence                                                                                         MUSKULOSKELETAL:  Denies arthritis, joint swelling, muscle weakness                                                             SKIN/BREAST:  Denies rash, itching, aicha loss, masses                                                                                            PSYCH:  Admits to some anxiety. Denies depression, suicidal ideation                                                                                               HEME/LYMPH:  Denies bruises easily, enlarged lymph nodes, tender lymph nodes                                        ENDOCRINE:  Denies cold intolerance, heat intolerance, polydipsia

## 2021-12-07 NOTE — H&P ADULT - NSHPLABSRESULTS_GEN_ALL_CORE
14.2   9.14  )-----------( 170      ( 07 Dec 2021 12:50 )             44.9       12-07    140  |  105  |  18  ----------------------------<  100<H>  4.2   |  26  |  0.99    Ca    9.7      07 Dec 2021 12:50  Mg     2.0     12-07      PT/INR - ( 07 Dec 2021 12:50 )   PT: 12.4 sec;   INR: 1.04          PTT - ( 07 Dec 2021 12:50 )  PTT:28.8 sec

## 2021-12-07 NOTE — H&P ADULT - ASSESSMENT
80 y/o F with Asthma, former smoker chronic diastolic heart failure and severe AS presenting with NYHA III symptoms/CCS III equivalent s/p PCI of the pLAD.  Pt with improved symptoms since PCI and no CP since procedure. He still reports NYHA II symptoms though with exertion.  No syncope/dizziness    Patient presents on 12/7/21 for TAVR.    Assesment:  81y Male PMH of CAD s/p PCI 11/5/21, HLD, chronic diastolic HF, AS, Glaucoma who presents for TAVR        Plan:  Problem 1: Aortic stenosis  - Preop workup completed  - PAtient to undergo TAVR  - Patient took ASA and Plavix this morning    Problem 2: Coronary Artery Disease s/p PCI  - Continue DAPT after TAVR      Problem 3: HLD  - Continue Lipitor after TAVR      Problem 4: Glaucoma  - Continue eye gtts after TAVR    I have reviewed clinical labs tests and reports, radiology tests and reports, as well as old patient medical records, and discussed with the refering physician.

## 2021-12-08 ENCOUNTER — TRANSCRIPTION ENCOUNTER (OUTPATIENT)
Age: 81
End: 2021-12-08

## 2021-12-08 ENCOUNTER — NON-APPOINTMENT (OUTPATIENT)
Age: 81
End: 2021-12-08

## 2021-12-08 VITALS — TEMPERATURE: 99 F

## 2021-12-08 PROBLEM — I35.0 NONRHEUMATIC AORTIC (VALVE) STENOSIS: Chronic | Status: ACTIVE | Noted: 2021-12-07

## 2021-12-08 PROBLEM — E78.5 HYPERLIPIDEMIA, UNSPECIFIED: Chronic | Status: ACTIVE | Noted: 2021-12-07

## 2021-12-08 PROBLEM — H40.9 UNSPECIFIED GLAUCOMA: Chronic | Status: ACTIVE | Noted: 2021-12-07

## 2021-12-08 PROBLEM — I25.10 ATHEROSCLEROTIC HEART DISEASE OF NATIVE CORONARY ARTERY WITHOUT ANGINA PECTORIS: Chronic | Status: ACTIVE | Noted: 2021-12-07

## 2021-12-08 PROBLEM — I50.32 CHRONIC DIASTOLIC (CONGESTIVE) HEART FAILURE: Chronic | Status: ACTIVE | Noted: 2021-12-07

## 2021-12-08 LAB
ALBUMIN SERPL ELPH-MCNC: 3.4 G/DL — SIGNIFICANT CHANGE UP (ref 3.3–5)
ALP SERPL-CCNC: 58 U/L — SIGNIFICANT CHANGE UP (ref 40–120)
ALT FLD-CCNC: 26 U/L — SIGNIFICANT CHANGE UP (ref 10–45)
ANION GAP SERPL CALC-SCNC: 10 MMOL/L — SIGNIFICANT CHANGE UP (ref 5–17)
APTT BLD: 28.7 SEC — SIGNIFICANT CHANGE UP (ref 27.5–35.5)
AST SERPL-CCNC: 31 U/L — SIGNIFICANT CHANGE UP (ref 10–40)
BILIRUB SERPL-MCNC: 0.4 MG/DL — SIGNIFICANT CHANGE UP (ref 0.2–1.2)
BUN SERPL-MCNC: 21 MG/DL — SIGNIFICANT CHANGE UP (ref 7–23)
CALCIUM SERPL-MCNC: 9 MG/DL — SIGNIFICANT CHANGE UP (ref 8.4–10.5)
CHLORIDE SERPL-SCNC: 108 MMOL/L — SIGNIFICANT CHANGE UP (ref 96–108)
CO2 SERPL-SCNC: 21 MMOL/L — LOW (ref 22–31)
CREAT SERPL-MCNC: 0.89 MG/DL — SIGNIFICANT CHANGE UP (ref 0.5–1.3)
GAS PNL BLDA: SIGNIFICANT CHANGE UP
GLUCOSE SERPL-MCNC: 182 MG/DL — HIGH (ref 70–99)
HCT VFR BLD CALC: 38.1 % — LOW (ref 39–50)
HGB BLD-MCNC: 12.7 G/DL — LOW (ref 13–17)
INR BLD: 1.06 — SIGNIFICANT CHANGE UP (ref 0.88–1.16)
MAGNESIUM SERPL-MCNC: 2 MG/DL — SIGNIFICANT CHANGE UP (ref 1.6–2.6)
MCHC RBC-ENTMCNC: 30.2 PG — SIGNIFICANT CHANGE UP (ref 27–34)
MCHC RBC-ENTMCNC: 33.3 GM/DL — SIGNIFICANT CHANGE UP (ref 32–36)
MCV RBC AUTO: 90.7 FL — SIGNIFICANT CHANGE UP (ref 80–100)
NRBC # BLD: 0 /100 WBCS — SIGNIFICANT CHANGE UP (ref 0–0)
PHOSPHATE SERPL-MCNC: 2.6 MG/DL — SIGNIFICANT CHANGE UP (ref 2.5–4.5)
PLATELET # BLD AUTO: 166 K/UL — SIGNIFICANT CHANGE UP (ref 150–400)
POTASSIUM SERPL-MCNC: 4.5 MMOL/L — SIGNIFICANT CHANGE UP (ref 3.5–5.3)
POTASSIUM SERPL-SCNC: 4.5 MMOL/L — SIGNIFICANT CHANGE UP (ref 3.5–5.3)
PROT SERPL-MCNC: 5.7 G/DL — LOW (ref 6–8.3)
PROTHROM AB SERPL-ACNC: 12.7 SEC — SIGNIFICANT CHANGE UP (ref 10.6–13.6)
RBC # BLD: 4.2 M/UL — SIGNIFICANT CHANGE UP (ref 4.2–5.8)
RBC # FLD: 13.1 % — SIGNIFICANT CHANGE UP (ref 10.3–14.5)
SODIUM SERPL-SCNC: 139 MMOL/L — SIGNIFICANT CHANGE UP (ref 135–145)
WBC # BLD: 11.36 K/UL — HIGH (ref 3.8–10.5)
WBC # FLD AUTO: 11.36 K/UL — HIGH (ref 3.8–10.5)

## 2021-12-08 PROCEDURE — 71045 X-RAY EXAM CHEST 1 VIEW: CPT | Mod: 26

## 2021-12-08 PROCEDURE — 93306 TTE W/DOPPLER COMPLETE: CPT | Mod: 26

## 2021-12-08 PROCEDURE — 99238 HOSP IP/OBS DSCHRG MGMT 30/<: CPT

## 2021-12-08 RX ORDER — MAGNESIUM OXIDE 400 MG ORAL TABLET 241.3 MG
800 TABLET ORAL ONCE
Refills: 0 | Status: COMPLETED | OUTPATIENT
Start: 2021-12-08 | End: 2021-12-08

## 2021-12-08 RX ORDER — CLOPIDOGREL BISULFATE 75 MG/1
1 TABLET, FILM COATED ORAL
Qty: 0 | Refills: 0 | DISCHARGE

## 2021-12-08 RX ORDER — METOPROLOL TARTRATE 50 MG
12.5 TABLET ORAL EVERY 12 HOURS
Refills: 0 | Status: DISCONTINUED | OUTPATIENT
Start: 2021-12-08 | End: 2021-12-08

## 2021-12-08 RX ORDER — POTASSIUM CHLORIDE 20 MEQ
1 PACKET (EA) ORAL
Qty: 14 | Refills: 0
Start: 2021-12-08 | End: 2021-12-21

## 2021-12-08 RX ORDER — ATORVASTATIN CALCIUM 80 MG/1
1 TABLET, FILM COATED ORAL
Qty: 30 | Refills: 0
Start: 2021-12-08 | End: 2022-01-06

## 2021-12-08 RX ORDER — FUROSEMIDE 40 MG
1 TABLET ORAL
Qty: 14 | Refills: 0
Start: 2021-12-08 | End: 2021-12-21

## 2021-12-08 RX ORDER — SENNA PLUS 8.6 MG/1
2 TABLET ORAL
Qty: 28 | Refills: 0
Start: 2021-12-08 | End: 2021-12-21

## 2021-12-08 RX ORDER — CLOPIDOGREL BISULFATE 75 MG/1
1 TABLET, FILM COATED ORAL
Qty: 30 | Refills: 0
Start: 2021-12-08 | End: 2022-01-06

## 2021-12-08 RX ORDER — ASPIRIN/CALCIUM CARB/MAGNESIUM 324 MG
1 TABLET ORAL
Qty: 30 | Refills: 0
Start: 2021-12-08 | End: 2022-01-06

## 2021-12-08 RX ORDER — ATORVASTATIN CALCIUM 80 MG/1
1 TABLET, FILM COATED ORAL
Qty: 0 | Refills: 0 | DISCHARGE

## 2021-12-08 RX ORDER — METOPROLOL TARTRATE 50 MG
1 TABLET ORAL
Qty: 60 | Refills: 0
Start: 2021-12-08 | End: 2022-01-06

## 2021-12-08 RX ORDER — METOPROLOL TARTRATE 50 MG
12.5 TABLET ORAL ONCE
Refills: 0 | Status: COMPLETED | OUTPATIENT
Start: 2021-12-08 | End: 2021-12-08

## 2021-12-08 RX ORDER — ASPIRIN/CALCIUM CARB/MAGNESIUM 324 MG
81 TABLET ORAL
Qty: 0 | Refills: 0 | DISCHARGE

## 2021-12-08 RX ADMIN — Medication 12.5 MILLIGRAM(S): at 13:41

## 2021-12-08 RX ADMIN — Medication 100 MILLIGRAM(S): at 13:41

## 2021-12-08 RX ADMIN — MAGNESIUM OXIDE 400 MG ORAL TABLET 800 MILLIGRAM(S): 241.3 TABLET ORAL at 08:06

## 2021-12-08 RX ADMIN — Medication 12.5 MILLIGRAM(S): at 08:06

## 2021-12-08 RX ADMIN — HEPARIN SODIUM 5000 UNIT(S): 5000 INJECTION INTRAVENOUS; SUBCUTANEOUS at 06:49

## 2021-12-08 RX ADMIN — Medication 81 MILLIGRAM(S): at 11:19

## 2021-12-08 RX ADMIN — CLOPIDOGREL BISULFATE 75 MILLIGRAM(S): 75 TABLET, FILM COATED ORAL at 11:19

## 2021-12-08 RX ADMIN — Medication 100 MILLIGRAM(S): at 06:50

## 2021-12-08 RX ADMIN — PANTOPRAZOLE SODIUM 40 MILLIGRAM(S): 20 TABLET, DELAYED RELEASE ORAL at 06:49

## 2021-12-08 RX ADMIN — HEPARIN SODIUM 5000 UNIT(S): 5000 INJECTION INTRAVENOUS; SUBCUTANEOUS at 13:41

## 2021-12-08 NOTE — DISCHARGE NOTE NURSING/CASE MANAGEMENT/SOCIAL WORK - NSDCPEFALRISK_GEN_ALL_CORE
For information on Fall & Injury Prevention, visit: [email or web address] OR  [email or web address] OR  https://www.cdc.gov/steadi/patient.html

## 2021-12-08 NOTE — DISCHARGE NOTE PROVIDER - NSDCQMAMI_CARD_ALL_CORE
No
Apixaban/Eliquis increases your risk for bleeding. Notify your doctor if you experience any of the following side effects: bleeding, coughing or vomiting blood, red or black stool, unexpected pain or swelling, itching or hives, chest pain, chest tightness, trouble breathing, changes in how much or how often you urinate, red or pink urine, numbness or tingling in your feet, or unusual muscle weakness. When Apixaban/Eliquis is taken with other medicines, they can affect how it works. Taking other medications such as aspirin, blood thinners, nonsteroidal anti-inflammatories, and medications that treat depression can increase your risk of bleeding. It is very important to tell your health care provider about all of the other medicines, including over-the-counter medications, herbs, and vitamins you are taking. DO NOT start, stop, or change the dosage of any medicine, including over-the-counter medicines, vitamins, and herbal products without your doctor’s approval. Any products containing aspirin or are nonsteroidal anti-inflammatories lessen the blood’s ability to form clots and add to the effect of Apixaban/Eliquis. Never take aspirin or medicines that contain aspirin without speaking to your doctor.

## 2021-12-08 NOTE — DISCHARGE NOTE PROVIDER - NSDCFUADDAPPT_GEN_ALL_CORE_FT
Dr. Nuno's office will call you with an appointment date and time.  If you have not heard from them by Friday 12/10/21, please call his office at [phone] to confirm your appointment.     Please follow-up with Dr. Dotson in 2-4 weeks to discuss your hospital stay.

## 2021-12-08 NOTE — DISCHARGE NOTE NURSING/CASE MANAGEMENT/SOCIAL WORK - PATIENT PORTAL LINK FT
You can access the FollowMyHealth Patient Portal offered by [facility] by registering at the following website: [email or web address]. By joining [facility]’s FollowMyHealth portal, you will also be able to view your health information using other applications (apps) compatible with our system.

## 2021-12-08 NOTE — DISCHARGE NOTE PROVIDER - NSDCCPCAREPLAN_GEN_ALL_CORE_FT
PRINCIPAL DISCHARGE DIAGNOSIS  Diagnosis: Aortic stenosis  Assessment and Plan of Treatment: It is very important that you continue your medications as prescribed after you are discharged.  You should refer to the medication reconciliation form provided to you on your discharge to information as to what medications to take and when.    Additionally, your medications have been sent to [facility] at [facility] and can be picked up as you are leaving the hospital.  The pharmacy is located next to the admitting office on the first floor.  If you have any issues obtaining your medications on the day of your discharge, please call [phone] for further assistance.      SECONDARY DISCHARGE DIAGNOSES  Diagnosis: History of cardiac monitoring  Assessment and Plan of Treatment: You had a MCOT monitor (an external cardiac rhythm monitoring device) placed on your day of discharge.  This helps us monitor your heart while you are out of the hospital for 30 days after discharge. Should your heart go into an abnormal or dangerous rhythm you will receieve a call from the MCOT team and your Structural Heart team of Doctors and PA's will be notified.  1. Keep the monitor within 30 feet of you at all times.  2. When you feel any symptom (chest pain, dizziness, palpitations, weakness, fatigue or anything outside of your normal), press the “Record Symptoms” button on the main phone of your phone  3. Shower or exercise as normal whilewearing the MCOT Patch. Do not swim or take a bath. Patch is water-resistant, not waterproof  4. When the battery is low on the phone or on the device, use the supplied . The monitor will show a warning message when the battery is low.  5. Do not remove the patch from yourskin after you begin monitoring. With normal wear, each patch should last 5 days. To replace the patch follow instructions in the MCOT box with the Patch Guide  6. Any issues with the MCOT device or phone please call Customer Service at [phone].  7. If you have any other questions at all please call the Structural Heart office at [phone]    Diagnosis: Post-operative state  Assessment and Plan of Treatment: -Walk daily as tolerated and use your incentive spirometer every hour.  -No driving or strenuous activity/exercise for 6 weeks, or until  cleared by your surgeon.  -Gently clean your incisions with anti-bacterial soap and water, pat  dry.  You may leave them open to air.  -Call your doctor if you have shortness of breath, chest pain not  relieved by pain medication, dizziness, fever >101.5, or increased  redness or drainage from

## 2021-12-08 NOTE — DISCHARGE NOTE PROVIDER - CARE PROVIDER_API CALL
Raad Nuno)  Cardiovascular Disease; Internal Medicine; Interventional Cardiology  [address] [place] [address]  Phone: [phone]  Fax: [phone]  Follow Up Time: 2 weeks    MANUEL HWANG  Internal Medicine  [address] [place] [address]  Phone: ([phone]  Fax: ()-  Follow Up Time: 1 month

## 2021-12-08 NOTE — DISCHARGE NOTE NURSING/CASE MANAGEMENT/SOCIAL WORK - NSDCFUADDAPPT_GEN_ALL_CORE_FT
Dr. Nuno's office will call you with an appointment date and time.  If you have not heard from them by Friday 12/10/21, please call his office at [phone] to confirm your appointment.     Please follow-up with Dr. Dotson in 2-4 weeks to discuss your hospital stay.

## 2021-12-08 NOTE — DISCHARGE NOTE PROVIDER - NSDCMRMEDTOKEN_GEN_ALL_CORE_FT
aspirin 81 mg oral tablet: 81 milligram(s) orally once a day  dorzolamide-timolol 2.23%-0.68% ophthalmic solution: 1 drop(s) to each affected eye 2 times a day  latanoprost 0.005% ophthalmic solution: 1 drop(s) to each affected eye once a day (in the evening)  Lipitor 40 mg oral tablet: 1 tab(s) orally once a day  Plavix 75 mg oral tablet: 1 tab(s) orally once a day   aspirin 81 mg oral delayed release tablet: 1 tab(s) orally once a day  atorvastatin 40 mg oral tablet: 1 tab(s) orally once a day (at bedtime)  clopidogrel 75 mg oral tablet: 1 tab(s) orally once a day  dorzolamide-timolol 2.23%-0.68% ophthalmic solution: 1 drop(s) to each affected eye 2 times a day  Lasix 20 mg oral tablet: 1 tab(s) orally once a day   latanoprost 0.005% ophthalmic solution: 1 drop(s) to each affected eye once a day (in the evening)  metoprolol tartrate 25 mg oral tablet: 1 tab(s) orally every 12 hours   potassium chloride 10 mEq oral tablet, extended release: 1 tab(s) orally once a day   senna oral tablet: 2 tab(s) orally once a day

## 2021-12-08 NOTE — DISCHARGE NOTE PROVIDER - HOSPITAL COURSE
This is a 81 year old male, former smoker with history of Asthma, Glaucoma, HLD, chronic diastolic CHF with EF 65%, CAD, and severe AS who initially presented with NYHA Class III and CCS Class 3 equivalent symptoms on 11/5/2021 s/p SANDEEP to LAD and referred to Dr. Nuno for TAVR evaluation.  On 12/7/21, he was admitted to [facility] where he underwent uncomplicated same day TAVR (26mm Mckeon Bebe).  He arrived to ICU care post-op on no gtt with no acute issues overnight. On 12/8/21, POD 1, ECHO done showing valve in position with trace PVL, no pericardial effusion.  He was evaluated and medically cleared for discharge to home with OT with plan for outpatient follow-up.

## 2021-12-09 ENCOUNTER — APPOINTMENT (OUTPATIENT)
Dept: CARE COORDINATION | Facility: HOME HEALTH | Age: 81
End: 2021-12-09

## 2021-12-09 VITALS
OXYGEN SATURATION: 98 % | SYSTOLIC BLOOD PRESSURE: 120 MMHG | RESPIRATION RATE: 18 BRPM | DIASTOLIC BLOOD PRESSURE: 62 MMHG | HEART RATE: 67 BPM

## 2021-12-09 DIAGNOSIS — Z86.39 PERSONAL HISTORY OF OTHER ENDOCRINE, NUTRITIONAL AND METABOLIC DISEASE: ICD-10-CM

## 2021-12-09 DIAGNOSIS — I35.0 NONRHEUMATIC AORTIC (VALVE) STENOSIS: ICD-10-CM

## 2021-12-09 DIAGNOSIS — Z86.69 PERSONAL HISTORY OF OTHER DISEASES OF THE NERVOUS SYSTEM AND SENSE ORGANS: ICD-10-CM

## 2021-12-09 DIAGNOSIS — H40.9 UNSPECIFIED GLAUCOMA: ICD-10-CM

## 2021-12-09 DIAGNOSIS — Z87.891 PERSONAL HISTORY OF NICOTINE DEPENDENCE: ICD-10-CM

## 2021-12-09 DIAGNOSIS — Z95.2 PRESENCE OF PROSTHETIC HEART VALVE: ICD-10-CM

## 2021-12-09 DIAGNOSIS — Z86.79 PERSONAL HISTORY OF OTHER DISEASES OF THE CIRCULATORY SYSTEM: ICD-10-CM

## 2021-12-09 DIAGNOSIS — Z87.09 PERSONAL HISTORY OF OTHER DISEASES OF THE RESPIRATORY SYSTEM: ICD-10-CM

## 2021-12-09 RX ORDER — METOPROLOL TARTRATE 25 MG/1
25 TABLET, FILM COATED ORAL
Refills: 0 | Status: ACTIVE | COMMUNITY

## 2021-12-09 NOTE — REVIEW OF SYSTEMS
[Lower Ext Edema] : lower extremity edema [Fever] : no fever [Heart Rate Is Slow] : the heart rate was not slow [Heart Rate Is Fast] : the heart rate was not fast [Chest Pain] : no chest pain [Palpitations] : no palpitations [Shortness Of Breath] : no shortness of breath [Wheezing] : no wheezing [Cough] : no cough [SOB on Exertion] : no shortness of breath during exertion [Abdominal Pain] : no abdominal pain [Constipation] : no constipation

## 2021-12-09 NOTE — PHYSICAL EXAM
[Sclera] : the sclera and conjunctiva were normal [Neck Appearance] : the appearance of the neck was normal [] : no respiratory distress [Respiration, Rhythm And Depth] : normal respiratory rhythm and effort [Exaggerated Use Of Accessory Muscles For Inspiration] : no accessory muscle use [Auscultation Breath Sounds / Voice Sounds] : lungs were clear to auscultation bilaterally [Apical Impulse] : the apical impulse was normal [Heart Sounds] : normal S1 and S2 [1+] : left 1+ [Bowel Sounds] : normal bowel sounds [Abdomen Soft] : soft [Abdomen Tenderness] : non-tender [Abnormal Walk] : normal gait [Oriented To Time, Place, And Person] : oriented to person, place, and time [Affect] : the affect was normal [FreeTextEntry1] : BL groin sites soft without bleeding or hematoma

## 2021-12-09 NOTE — ASSESSMENT
[FreeTextEntry1] : Pt. is a 81 year old male with PMHx AS and CHF of who is s/p TAVR with MD Nuno on 12/7. \par \par \par

## 2021-12-09 NOTE — HISTORY OF PRESENT ILLNESS
[FreeTextEntry1] : This pt. is enrolled in the Follow Your Heart (FYH) program through [facility]. Home visit made to Mr. MEJIA, a pt. of MD Nuno s/p TAVR on 12/7. Discharged home from [facility].\par \par Pt. seen for post-hospitalization transitional care management and post-surgical follow-up. Arrived to pt. home. Pt. appears well, in no distress, ambulating without compromise. Pt. reports his MCOT has not been working. Pt has all medications per DC instructions and is taking them as prescribed. Denies fever, SOB, CP or palpitations.

## 2021-12-10 RX ORDER — ATORVASTATIN CALCIUM 40 MG/1
40 TABLET, FILM COATED ORAL
Qty: 90 | Refills: 3 | Status: ACTIVE | COMMUNITY
Start: 1900-01-01 | End: 1900-01-01

## 2021-12-13 DIAGNOSIS — I25.10 ATHEROSCLEROTIC HEART DISEASE OF NATIVE CORONARY ARTERY WITHOUT ANGINA PECTORIS: ICD-10-CM

## 2021-12-13 DIAGNOSIS — Z92.89 PERSONAL HISTORY OF OTHER MEDICAL TREATMENT: ICD-10-CM

## 2021-12-13 DIAGNOSIS — I50.32 CHRONIC DIASTOLIC (CONGESTIVE) HEART FAILURE: ICD-10-CM

## 2021-12-13 DIAGNOSIS — I35.0 NONRHEUMATIC AORTIC (VALVE) STENOSIS: ICD-10-CM

## 2021-12-13 DIAGNOSIS — J45.909 UNSPECIFIED ASTHMA, UNCOMPLICATED: ICD-10-CM

## 2021-12-13 DIAGNOSIS — E78.5 HYPERLIPIDEMIA, UNSPECIFIED: ICD-10-CM

## 2021-12-13 DIAGNOSIS — Z87.891 PERSONAL HISTORY OF NICOTINE DEPENDENCE: ICD-10-CM

## 2021-12-13 DIAGNOSIS — H40.9 UNSPECIFIED GLAUCOMA: ICD-10-CM

## 2021-12-14 DIAGNOSIS — Z00.6 ENCOUNTER FOR EXAMINATION FOR NORMAL COMPARISON AND CONTROL IN CLINICAL RESEARCH PROGRAM: ICD-10-CM

## 2021-12-20 ENCOUNTER — APPOINTMENT (OUTPATIENT)
Dept: HEART AND VASCULAR | Facility: CLINIC | Age: 81
End: 2021-12-20
Payer: MEDICARE

## 2021-12-20 ENCOUNTER — NON-APPOINTMENT (OUTPATIENT)
Age: 81
End: 2021-12-20

## 2021-12-20 VITALS
HEART RATE: 63 BPM | HEIGHT: 69 IN | TEMPERATURE: 98.7 F | WEIGHT: 162 LBS | BODY MASS INDEX: 23.99 KG/M2 | OXYGEN SATURATION: 96 % | DIASTOLIC BLOOD PRESSURE: 65 MMHG | SYSTOLIC BLOOD PRESSURE: 110 MMHG

## 2021-12-20 PROCEDURE — 99214 OFFICE O/P EST MOD 30 MIN: CPT

## 2021-12-20 PROCEDURE — 93000 ELECTROCARDIOGRAM COMPLETE: CPT

## 2021-12-21 VITALS
HEART RATE: 63 BPM | BODY MASS INDEX: 23.99 KG/M2 | TEMPERATURE: 98.7 F | RESPIRATION RATE: 18 BRPM | WEIGHT: 162 LBS | OXYGEN SATURATION: 96 % | HEIGHT: 69 IN

## 2021-12-21 NOTE — HISTORY OF PRESENT ILLNESS
[FreeTextEntry1] : 80 y/o M with CAD s/p PCI of pLAD (11/2021), chronic diastolic heart failure, severe AS who underwent a transfemoral TAVR on 12/7/21 who presents for follow up after discharge.\par \par The procedure was uncomplicated and the patient was discharged on post op day 1.\par \par Since discharge the patient states NYHA 1 symptoms\par No CP/SOB

## 2021-12-21 NOTE — PHYSICAL EXAM
[Well Developed] : well developed [Well Nourished] : well nourished [No Acute Distress] : no acute distress [Normal Conjunctiva] : normal conjunctiva [Normal Venous Pressure] : normal venous pressure [No Carotid Bruit] : no carotid bruit [No Rub] : no rub [No Gallop] : no gallop [Clear Lung Fields] : clear lung fields [Good Air Entry] : good air entry [No Respiratory Distress] : no respiratory distress  [Soft] : abdomen soft [Non Tender] : non-tender [No Masses/organomegaly] : no masses/organomegaly [Normal Bowel Sounds] : normal bowel sounds [Normal Gait] : normal gait [No Edema] : no edema [No Cyanosis] : no cyanosis [No Clubbing] : no clubbing [No Varicosities] : no varicosities [No Rash] : no rash [No Skin Lesions] : no skin lesions [Moves all extremities] : moves all extremities [No Focal Deficits] : no focal deficits [Normal Speech] : normal speech [Alert and Oriented] : alert and oriented [Normal memory] : normal memory [Murmur] : murmur

## 2021-12-21 NOTE — ASSESSMENT
[FreeTextEntry1] : 82 y/o M with Asthma, former smoker chronic diastolic heart failure and severe AS presenting with NYHA III symptoms/CCS III equivalent s/p PCI of the pLAD and s/p TAVR with a 26mm Bebe 3. \par -well functioing valve\par -1 month TAVR TTE\par -pt self d/c'ed MCOT due to difficulties working it and redness of the skin, EKG today reviewed and NSR, no conduction deficits\par -ASA/Plavix\par -d/c lasix/K+, euvolemic on exam\par

## 2021-12-21 NOTE — REASON FOR VISIT
[Cardiac Failure] : cardiac failure [Structural Heart and Valve Disease] : structural heart and valve disease [FreeTextEntry1] : 82 y/o M with Asthma, former smoker chronic diastolic heart failure and severe AS presenting with NYHA III symptoms/CCS III equivalent s/p PCI of the pLAD and s/p TAVR with a 26mm Bebe 3. \par \par Pt with improved symptoms, NYHA 1\par \par EKG reviewed from 10/21/21- NSR, no ST changes\par Echo reviewed from 10/21/21- Normal LVEF, severe AS (MG 72mmHg, PG 130mmHg, GARRETT 0.7)\par Labs reviewed from 10/11/21- BMP WNL, Lipids- , CBC WNL \par Cardiac cath 11/5/21- 1V CAD, pLAD 75% s/p PCI with excellent result\par TAVR 12/7/2021- successful TAVR with a 26mm Bebe 3 (TF appraoch)

## 2021-12-30 PROCEDURE — 86850 RBC ANTIBODY SCREEN: CPT

## 2021-12-30 PROCEDURE — 83735 ASSAY OF MAGNESIUM: CPT

## 2021-12-30 PROCEDURE — 86901 BLOOD TYPING SEROLOGIC RH(D): CPT

## 2021-12-30 PROCEDURE — 84100 ASSAY OF PHOSPHORUS: CPT

## 2021-12-30 PROCEDURE — 86900 BLOOD TYPING SEROLOGIC ABO: CPT

## 2021-12-30 PROCEDURE — C8929: CPT

## 2021-12-30 PROCEDURE — 84132 ASSAY OF SERUM POTASSIUM: CPT

## 2021-12-30 PROCEDURE — 85027 COMPLETE CBC AUTOMATED: CPT

## 2021-12-30 PROCEDURE — 85610 PROTHROMBIN TIME: CPT

## 2021-12-30 PROCEDURE — C1889: CPT

## 2021-12-30 PROCEDURE — 71045 X-RAY EXAM CHEST 1 VIEW: CPT

## 2021-12-30 PROCEDURE — 86923 COMPATIBILITY TEST ELECTRIC: CPT

## 2021-12-30 PROCEDURE — 80053 COMPREHEN METABOLIC PANEL: CPT

## 2021-12-30 PROCEDURE — 82330 ASSAY OF CALCIUM: CPT

## 2021-12-30 PROCEDURE — 85025 COMPLETE CBC W/AUTO DIFF WBC: CPT

## 2021-12-30 PROCEDURE — 83880 ASSAY OF NATRIURETIC PEPTIDE: CPT

## 2021-12-30 PROCEDURE — 84295 ASSAY OF SERUM SODIUM: CPT

## 2021-12-30 PROCEDURE — C1760: CPT

## 2021-12-30 PROCEDURE — 93306 TTE W/DOPPLER COMPLETE: CPT

## 2021-12-30 PROCEDURE — 85730 THROMBOPLASTIN TIME PARTIAL: CPT

## 2021-12-30 PROCEDURE — 82803 BLOOD GASES ANY COMBINATION: CPT

## 2021-12-30 PROCEDURE — 80048 BASIC METABOLIC PNL TOTAL CA: CPT

## 2021-12-30 PROCEDURE — 36415 COLL VENOUS BLD VENIPUNCTURE: CPT

## 2022-01-06 ENCOUNTER — TRANSCRIPTION ENCOUNTER (OUTPATIENT)
Age: 82
End: 2022-01-06

## 2022-01-28 ENCOUNTER — NON-APPOINTMENT (OUTPATIENT)
Age: 82
End: 2022-01-28

## 2022-01-28 ENCOUNTER — APPOINTMENT (OUTPATIENT)
Dept: HEART AND VASCULAR | Facility: CLINIC | Age: 82
End: 2022-01-28
Payer: MEDICARE

## 2022-01-28 VITALS
OXYGEN SATURATION: 97 % | BODY MASS INDEX: 24.14 KG/M2 | DIASTOLIC BLOOD PRESSURE: 70 MMHG | TEMPERATURE: 98.7 F | HEIGHT: 69 IN | HEART RATE: 55 BPM | SYSTOLIC BLOOD PRESSURE: 135 MMHG | WEIGHT: 163 LBS

## 2022-01-28 VITALS
DIASTOLIC BLOOD PRESSURE: 70 MMHG | BODY MASS INDEX: 24.14 KG/M2 | HEART RATE: 60 BPM | OXYGEN SATURATION: 97 % | RESPIRATION RATE: 18 BRPM | TEMPERATURE: 98 F | WEIGHT: 163 LBS | SYSTOLIC BLOOD PRESSURE: 135 MMHG | HEIGHT: 69 IN

## 2022-01-28 PROCEDURE — 93000 ELECTROCARDIOGRAM COMPLETE: CPT

## 2022-01-28 PROCEDURE — 99214 OFFICE O/P EST MOD 30 MIN: CPT

## 2022-01-28 RX ORDER — FUROSEMIDE 20 MG/1
20 TABLET ORAL
Refills: 0 | Status: DISCONTINUED | COMMUNITY
End: 2022-01-28

## 2022-01-28 RX ORDER — SENNOSIDES 8.6 MG TABLETS 8.6 MG/1
TABLET ORAL
Refills: 0 | Status: DISCONTINUED | COMMUNITY
End: 2022-01-28

## 2022-01-28 NOTE — REVIEW OF SYSTEMS
[Fever] : no fever [Chills] : no chills [Blurry Vision] : no blurred vision [Earache] : no earache [Sore Throat] : no sore throat [SOB] : no shortness of breath [Dyspnea on exertion] : not dyspnea during exertion [Chest Discomfort] : no chest discomfort [Lower Ext Edema] : no extremity edema [Syncope] : no syncope [Cough] : no cough [Abdominal Pain] : no abdominal pain [Change in Appetite] : no change in appetite [Urinary Frequency] : no change in urinary frequency [Joint Pain] : no joint pain [Rash] : no rash [Confusion] : no confusion was observed [Easy Bleeding] : no tendency for easy bleeding

## 2022-01-28 NOTE — HISTORY OF PRESENT ILLNESS
[FreeTextEntry1] : 81 year old male, former smoker, with past medical history of Asthma, Glaucoma, HLD, chronic diastolic CHF with EF 65%, CAD, s/p PCI of pLAD (11/2021), chronic diastolic heart failure, severe AS who underwent a transfemoral TAVR on 12/7/21 (26mm Bebe Ultra) who presents for one month follow up.  \par \par Since his last visit, the patient states NYHA 1 symptoms.\par No CP/SOB\par Doing well\par \par EKG today reviewed: NSR, no ST changes\par TTE today reviewed: Normal LVEF, no PVL, EOA >1.8cm2, MG 10mmHg\par TAVR films reviewed: s/p 26mm Bebe Ultra. No complications

## 2022-01-28 NOTE — PHYSICAL EXAM
[Well Developed] : well developed [Well Nourished] : well nourished [No Acute Distress] : no acute distress [Normal Conjunctiva] : normal conjunctiva [Normal Venous Pressure] : normal venous pressure [No Carotid Bruit] : no carotid bruit [No Gallop] : no gallop [Clear Lung Fields] : clear lung fields [Good Air Entry] : good air entry [No Respiratory Distress] : no respiratory distress  [Soft] : abdomen soft [Non Tender] : non-tender [No Masses/organomegaly] : no masses/organomegaly [Normal Bowel Sounds] : normal bowel sounds [Normal Gait] : normal gait [No Edema] : no edema [No Cyanosis] : no cyanosis [No Clubbing] : no clubbing [No Varicosities] : no varicosities [No Rash] : no rash [No Skin Lesions] : no skin lesions [Moves all extremities] : moves all extremities [No Focal Deficits] : no focal deficits [Normal Speech] : normal speech [Alert and Oriented] : alert and oriented [Normal memory] : normal memory [Normal S1, S2] : normal S1, S2 [No Murmur] : no murmur

## 2022-01-28 NOTE — ASSESSMENT
[FreeTextEntry1] : 82 y/o M with Asthma, HTN, HLD, former smoker chronic diastolic heart failure and severe AS s/p PCI of the pLAD and s/p TAVR with a 26mm Bebe 3. Doing well, NYHA 1\par -ASA/Plavix x1 year\par -1 month TTE reviewed: good LV function, no PVL\par -EKG 12/20/21 shows NSR, narrow QRS\par -continue metoprolol for HTN\par -Statin therapy for HLD\par -f/u with Dr. Mitchell\par

## 2022-05-31 NOTE — ASSESSMENT
[FreeTextEntry1] : 82 y/o M with CAD s/p PCI of pLAD (11/2021), chronic diastolic heart failure, severe AS who presents for f/u. TAVR scan reviewed with good anatomy for TF TAVR. Cath reviewed, excellent angiographic result. CTA reviewed, good TF anatomy for TAVR. Stable, NYHA II symptoms\par -continue DAPT\par -CT Surgery consult with Dr. Garcia\par -Plan for TAVR in 2-4 wks after preop testing (pt would like to go 12/7-12/9 week)\par -lipitor for HLD\par -HTN- BP ~150sbp, will hold off on meds for now\par 
Private car

## 2023-01-20 ENCOUNTER — APPOINTMENT (OUTPATIENT)
Dept: HEART AND VASCULAR | Facility: CLINIC | Age: 83
End: 2023-01-20
Payer: MEDICARE

## 2023-01-20 VITALS
WEIGHT: 165 LBS | DIASTOLIC BLOOD PRESSURE: 70 MMHG | HEIGHT: 68 IN | HEART RATE: 61 BPM | SYSTOLIC BLOOD PRESSURE: 119 MMHG | BODY MASS INDEX: 25.01 KG/M2 | OXYGEN SATURATION: 97 %

## 2023-01-20 PROCEDURE — 99214 OFFICE O/P EST MOD 30 MIN: CPT

## 2023-01-20 RX ORDER — POTASSIUM CHLORIDE 750 MG/1
10 CAPSULE, EXTENDED RELEASE ORAL
Refills: 0 | Status: DISCONTINUED | COMMUNITY
End: 2023-01-20

## 2023-01-20 RX ORDER — CLOPIDOGREL 75 MG/1
75 TABLET, FILM COATED ORAL
Refills: 0 | Status: DISCONTINUED | COMMUNITY
End: 2023-01-20

## 2023-01-20 NOTE — ASSESSMENT
[FreeTextEntry1] : 83 y/o M with Asthma, HTN, HLD, former smoker chronic diastolic heart failure and severe AS s/p PCI of the pLAD and s/p TAVR with a 26mm Bebe 3.\par -ASA monotherapy at this piont\par -1 month TTE reviewed: good LV function, no PVL, 1yr TTE ordered\par -continue metoprolol for HTN\par -Statin therapy for HLD\par \par

## 2023-01-20 NOTE — REASON FOR VISIT
[FreeTextEntry1] : 83 y/o M with Asthma, former smoker chronic diastolic heart failure and severe AS presenting with NYHA III symptoms/CCS III equivalent s/p PCI of the pLAD and s/p TAVR with a 26mm Bebe 3. \par Recent hospitalization with COVID\par Since COVID has mild SOB\par Denies CP\par \par \par Testing/Imaging Reviewed:\par EKG reviewed from 10/21/21- NSR, no ST changes\par Echo reviewed from 10/21/21- Normal LVEF, severe AS (MG 72mmHg, PG 130mmHg, GARRETT 0.7)\par Labs reviewed from 10/11/21- BMP WNL, Lipids- , CBC WNL \par Cardiac cath 11/5/21- 1V CAD, pLAD 75% s/p PCI with excellent result\par TAVR 12/7/2021- successful TAVR with a 26mm Bebe 3 (TF appraoch)

## 2025-07-07 NOTE — PRE-OP CHECKLIST - PATIENT'S PERSONAL PROPERTY GIVEN TO
No care due was identified.  [facility] Embedded Care Due Messages. Reference number: [id number].   7/07/2025 3:59:24 PM CDT   security/safe clothes, shoes, jacket/security/safe